# Patient Record
Sex: FEMALE | Employment: FULL TIME | URBAN - METROPOLITAN AREA
[De-identification: names, ages, dates, MRNs, and addresses within clinical notes are randomized per-mention and may not be internally consistent; named-entity substitution may affect disease eponyms.]

---

## 2021-11-12 ENCOUNTER — TELEPHONE (OUTPATIENT)
Dept: OBSTETRICS AND GYNECOLOGY | Facility: CLINIC | Age: 21
End: 2021-11-12
Payer: MEDICAID

## 2021-11-15 ENCOUNTER — TELEPHONE (OUTPATIENT)
Dept: OBSTETRICS AND GYNECOLOGY | Facility: CLINIC | Age: 21
End: 2021-11-15
Payer: MEDICAID

## 2024-02-25 ENCOUNTER — NURSE TRIAGE (OUTPATIENT)
Dept: ADMINISTRATIVE | Facility: CLINIC | Age: 24
End: 2024-02-25
Payer: MEDICAID

## 2024-02-25 NOTE — TELEPHONE ENCOUNTER
Pt calls asking what type of provider would diagnose Pelvic Inflammatory Disease. NT advises that typically PID is dx through an OBGYN but pt could also reach out to PCP for further information. Pt then asks if she can be set up with scheduling. Declines triage at this time. Pt is given the number for central scheduling and transferred over. Prior to transfer, pt is instructed to call back with any new/worsening sxs, questions, or concerns.   Reason for Disposition   Health Information question, no triage required and triager able to answer question    Additional Information   Negative: RN needs further essential information from caller in order to complete triage   Negative: Requesting regular office appointment   Negative: [1] Caller requesting NON-URGENT health information AND [2] PCP's office is the best resource    Protocols used: Information Only Call - No Triage-A-

## 2024-02-26 ENCOUNTER — TELEPHONE (OUTPATIENT)
Dept: OBSTETRICS AND GYNECOLOGY | Facility: CLINIC | Age: 24
End: 2024-02-26

## 2024-02-26 NOTE — TELEPHONE ENCOUNTER
Pt called in regards to appt, advised pt  that we are not accepting any new pt at this time References given       Laureen RICE LPN  OB/GYN

## 2024-02-26 NOTE — TELEPHONE ENCOUNTER
----- Message from Niki Iqbal sent at 2/26/2024  9:35 AM CST -----  Contact: Self/ [phone]  Reason for the appointment:  check for PID  Patient preference of timeframe to be scheduled:    Would the patient like a call back, or a response through their [facility] portal?:   call back   Comments: